# Patient Record
(demographics unavailable — no encounter records)

---

## 2024-10-23 NOTE — HISTORY OF PRESENT ILLNESS
[FreeTextEntry1] :  JAGUAR LÓPEZ Jun 5 1956   Language: English Date of First visit: 10/23/2024 Accompanied by: self Contact info: Referring Provider/PCP: Dr. Smith Fax: [phone]    CC/RUIZ Problem List: Nocturia =============================================================================== FIRST VISIT / Summary: Very pleasant 68 year old M here for LUTS - nocturia x4. He tried a medication before but he only took one bottle. Has dinner 8pm, drinks medications with 16oz water at 9pm and if working next day goes to bed also at 9pm. Weekends sleeps at 11pm. He notes he has been told sleeping in chair he wakes up while snoring. Wife tells him he snores sometimes. He is not sure how much. Sleeps on his back.    ------------------------------------------------------------------------------------------- INTERVAL VISITS:   ===============================================================================   PMH:  FHx: SocHx:   PSH:   ROS: Review of Systems is as per HPI unless otherwise denoted below   =============================================================================== DATA: LABS (SELECTED):--------------------------------------------------------------------------------------------------- 5/31/2024: A1c 5.4, Cr 1.2   RADS:---------------------------------------------------------------------------------------------------------------------     PATHOLOGY/CYTOLOGY:-------------------------------------------------------------------------------------------     VOIDING STUDIES: ----------------------------------------------------------------------------------------------------     STONE STUDIES: (Analysis/LLSA)----------------------------------------------------------------------------------     PROCEDURES: -----------------------------------------------------------------------------------------------       =============================================================================== PHYSICAL EXAM:    FOCUSED: ----------------------------------------------------------------------------------------------------------------     ======================================================================================= DISCUSSION: ======================================================================================= ASSESSMENT and PLAN   1. LUTS - he will bring his medication (suspect alpha blocker) that is helping him - no fluids 2hrs before bed (increase during daytime) - treat constipation with miralax 1/2 serving/day and increase as needed - Home sleep apnea test - RTC in 1-2 months for uroflow and results of above  2. PSA screening - no PSA on recent bloodwork check today   =======================================================================================  4g Thank you for allowing me to assist in the care of your patient. Should you have any questions please do not hesitate to reach out to me.     Redd Jimenez MD                                                    [facility] Physician [facility]   [place] Office: [address], [address] [place] [address] T: [phone] F: [phone]   [place] Office: [address] [place] [address], 1st [place] [address] T: [phone] F: [phone]

## 2024-10-23 NOTE — HISTORY OF PRESENT ILLNESS
[FreeTextEntry1] :  JAGUAR LÓPEZ Jun 5 1956   Language: English Date of First visit: 10/23/2024 Accompanied by: self Contact info: Referring Provider/PCP: Dr. Smith Fax: [phone]    CC/RUIZ Problem List: Nocturia =============================================================================== FIRST VISIT / Summary: Very pleasant 68 year old M here for LUTS - nocturia x4. He tried a medication before but he only took one bottle. Has dinner 8pm, drinks medications with 16oz water at 9pm and if working next day goes to bed also at 9pm. Weekends sleeps at 11pm. He notes he has been told sleeping in chair he wakes up while snoring. Wife tells him he snores sometimes. He is not sure how much. Sleeps on his back.    ------------------------------------------------------------------------------------------- INTERVAL VISITS:   ===============================================================================   PMH:  FHx: SocHx:   PSH:   ROS: Review of Systems is as per HPI unless otherwise denoted below   =============================================================================== DATA: LABS (SELECTED):--------------------------------------------------------------------------------------------------- 5/31/2024: A1c 5.4, Cr 1.2   RADS:---------------------------------------------------------------------------------------------------------------------     PATHOLOGY/CYTOLOGY:-------------------------------------------------------------------------------------------     VOIDING STUDIES: ----------------------------------------------------------------------------------------------------     STONE STUDIES: (Analysis/LLSA)----------------------------------------------------------------------------------     PROCEDURES: -----------------------------------------------------------------------------------------------       =============================================================================== PHYSICAL EXAM:    FOCUSED: ----------------------------------------------------------------------------------------------------------------     ======================================================================================= DISCUSSION: ======================================================================================= ASSESSMENT and PLAN   1. LUTS - he will bring his medication (suspect alpha blocker) that is helping him - no fluids 2hrs before bed (increase during daytime) - treat constipation with miralax 1/2 serving/day and increase as needed - Home sleep apnea test - RTC in 1-2 months for uroflow and results of above  2. PSA screening - no PSA on recent bloodwork check today   =======================================================================================  4g Thank you for allowing me to assist in the care of your patient. Should you have any questions please do not hesitate to reach out to me.     Redd Jimenez MD                                                    [facility] Physician [facility]   [place] Office: [address] [facility], [address] [place] [address] T: [phone] F: [phone]   [place] Office: [address] [place] [address], 1st [place] [address] T: [phone] F: [phone]